# Patient Record
Sex: MALE | Race: WHITE | Employment: OTHER | URBAN - METROPOLITAN AREA
[De-identification: names, ages, dates, MRNs, and addresses within clinical notes are randomized per-mention and may not be internally consistent; named-entity substitution may affect disease eponyms.]

---

## 2021-01-01 ENCOUNTER — HOSPITAL ENCOUNTER (EMERGENCY)
Facility: HOSPITAL | Age: 76
End: 2021-01-01
Attending: EMERGENCY MEDICINE

## 2021-01-01 VITALS — OXYGEN SATURATION: 99 %

## 2021-01-01 DIAGNOSIS — I46.9 CARDIAC ARREST (HCC): Primary | ICD-10-CM

## 2021-01-01 PROCEDURE — 82962 GLUCOSE BLOOD TEST: CPT

## 2021-01-01 PROCEDURE — 99285 EMERGENCY DEPT VISIT HI MDM: CPT

## 2021-01-01 PROCEDURE — 92950 HEART/LUNG RESUSCITATION CPR: CPT

## 2021-04-14 NOTE — ED QUICK NOTES
[address] [address] , Lety alanis. Body will remain on hold due to lack of pt's personal information, his PCP, or next of kin. Per medics pt's wife is on her way to the ER. Will notify  when more information is obtained.

## 2021-04-14 NOTE — ED INITIAL ASSESSMENT (HPI)
Per medics pt was a witnessed arrest. Pt was sitting in a chair then slumped over and was unresponsive. Pt was PEA with paramedics.  CPR was in progress and 4 epis had been given prior to ER arrival.

## 2021-04-14 NOTE — ED QUICK NOTES
While preparing pt for possible  visitation from family and changed into a hospital gown there were 3 fresh looking skin tears noted to pt's body. Moderate sized skin tear to left hand, and two minor skin tears to pt's right upper arm.

## 2021-04-14 NOTE — ED QUICK NOTES
Pulse check. No femoral pulse palpated. Fast exam being done at bedside. Slight cardiac activity noted by ER MD.   No carotid pulse palpated.

## 2021-04-14 NOTE — ED QUICK NOTES
Contacted Maria Alejandra from [facility]. Pt is a candidate for tissue donation. Will follow up with Maria Alejandra once pt's wife arrives to the ER with more information about pt's medical history.

## 2021-04-15 NOTE — ED PROVIDER NOTES
Patient Seen in: [facility] Emergency Department      History   Patient presents with:  Cardiac Arrest    Stated Complaint: arrest    HPI/Subjective:   HPI    66year old male who is brought from home after witnessed collapse.   EMS called to repor Reviewed   POCT GLUCOSE - Normal         MDM      Pulse Ox: 99%, Normal, RA    Critical Care:  I spent a total of 30 minutes of critical care time in obtaining history, performing a physical exam, bedside monitoring of interventions, collecting and interpre